# Patient Record
Sex: FEMALE | Race: WHITE
[De-identification: names, ages, dates, MRNs, and addresses within clinical notes are randomized per-mention and may not be internally consistent; named-entity substitution may affect disease eponyms.]

---

## 2018-12-24 ENCOUNTER — HOSPITAL ENCOUNTER (OUTPATIENT)
Age: 33
Discharge: HOME | End: 2018-12-24

## 2018-12-24 ENCOUNTER — HOSPITAL ENCOUNTER (OUTPATIENT)
Dept: HOSPITAL 91 - OBT | Age: 33
Discharge: HOME | End: 2018-12-24
Payer: COMMERCIAL

## 2018-12-24 DIAGNOSIS — O09.523: ICD-10-CM

## 2018-12-24 DIAGNOSIS — Z3A.36: ICD-10-CM

## 2018-12-24 DIAGNOSIS — O09.293: Primary | ICD-10-CM

## 2018-12-24 DIAGNOSIS — O36.8330: ICD-10-CM

## 2018-12-24 LAB — RUPTURE FETAL MEMBRANES: NEGATIVE

## 2018-12-24 PROCEDURE — 76818 FETAL BIOPHYS PROFILE W/NST: CPT

## 2018-12-24 PROCEDURE — 84112 EVAL AMNIOTIC FLUID PROTEIN: CPT

## 2018-12-31 ENCOUNTER — HOSPITAL ENCOUNTER (OUTPATIENT)
Dept: HOSPITAL 10 - OBT | Age: 33
Discharge: HOME | End: 2018-12-31
Attending: SPECIALIST
Payer: COMMERCIAL

## 2018-12-31 ENCOUNTER — HOSPITAL ENCOUNTER (OUTPATIENT)
Dept: HOSPITAL 91 - OBT | Age: 33
Discharge: HOME | End: 2018-12-31
Payer: COMMERCIAL

## 2018-12-31 VITALS — HEART RATE: 76 BPM | SYSTOLIC BLOOD PRESSURE: 119 MMHG | DIASTOLIC BLOOD PRESSURE: 71 MMHG | RESPIRATION RATE: 20 BRPM

## 2018-12-31 VITALS
HEIGHT: 63 IN | BODY MASS INDEX: 42.97 KG/M2 | BODY MASS INDEX: 42.97 KG/M2 | WEIGHT: 242.51 LBS | HEIGHT: 63 IN | WEIGHT: 242.51 LBS

## 2018-12-31 DIAGNOSIS — O36.8330: ICD-10-CM

## 2018-12-31 DIAGNOSIS — O09.523: ICD-10-CM

## 2018-12-31 DIAGNOSIS — Z3A.38: ICD-10-CM

## 2018-12-31 DIAGNOSIS — O09.293: Primary | ICD-10-CM

## 2018-12-31 LAB — RUPTURE FETAL MEMBRANES: NEGATIVE

## 2018-12-31 PROCEDURE — G0463 HOSPITAL OUTPT CLINIC VISIT: HCPCS

## 2018-12-31 PROCEDURE — 76815 OB US LIMITED FETUS(S): CPT

## 2018-12-31 PROCEDURE — 84112 EVAL AMNIOTIC FLUID PROTEIN: CPT

## 2018-12-31 PROCEDURE — 76818 FETAL BIOPHYS PROFILE W/NST: CPT

## 2018-12-31 NOTE — PN
Triage Information


Date/Time





Reason for visit:  38+wks Hx of IUFD for NST BPP


Weeks of Gestation


38+


/Para


5/4


Diabetes:  none


Hypertention:  none





Objective





Vital Signs


  Date      Temp  Pulse  Resp  B/P (MAP)   Pulse Ox  O2          O2 Flow    FiO2


Time                                                 Delivery    Rate


  18  97.7     76    20      119/71            Room Air


     09:02                           (87)








Results/Medications


Results 24 hrs





Laboratory Tests


                    Test
                    18
11:10


                    Fetal Membranes Rupture  NEGATIVE





Disposition:  Discharge


Assessment/Plan


BP 10/10


Precautions discussed


Questions answered


Follow up with provider











KEITH THOMPSON M.D.            Dec 31, 2018 12:19

## 2018-12-31 NOTE — TRIAGE
===================================

OB Triage

===================================

Datetime Report Generated by CPN: 12/31/2018 13:17

   

   

===========================

Datetime: 12/31/2018 10:45

===========================

   

   

===================================

Labor Evaluation

===================================

   

 Frequency:  NONE

 Pattern:  Normal: <= 5 Contractions in 10 Minutes

 Resting Tone Bud:  Relaxed

   

===================================

Fetal Heart Rate

===================================

   

 FHR Baseline Rate:  135

 Monitor Mode:  External US

 FHR Baseline Changes:  No Baseline Change

 Variability:  Moderate 6-25 bpm

 Accelerations:  15X15

 Decelerations:  None

 Category:  Category I

   

===================================

Pain Assessment

===================================

   

 Pain Scale:  0

 Pain Goal:  0

   

===========================

Datetime: 12/31/2018 08:51

===========================

   

   

===================================

Maternal Assessment

===================================

   

 Level of Consciousness:  Fully Conscious

 DTR's/Clonus:  DTRs 2+; No Clonus

 Headache:  Denies

 Blurred Vision:  No

 Respiratory Effort:  Unlabored; Regular Rhythm; Equal Expansion

 Breath Sounds, Left:  Clear and Equal

 Breath Sounds, Right:  Clear and Equal

 Nausea/Vomiting:  Denies

 RUQ Epigastric Pain:  Denies

 Facial Edema:  None

 Temperature Route:  Axillary

   

===================================

Fall Risk Assessment

===================================

   

 History of Falling:  (0) No

 Secondary Diagnosis:  (0) No

 Ambulatory Aid:  (0) Bedrest/Nurse Assist

 IV Therapy:  (0) No

 Gait:  (0) Normal/Bedrest/Immobile

 Mental Status:  (0) Oriented to Own Ability

 Fall Score:  0

 Fall Risk Score Definition:  No Risk: No action required

   

===========================

Datetime: 12/31/2018 08:45

===========================

   

   

===================================

Maternal Assessment

===================================

   

 Level of Consciousness:  Fully Conscious

 DTR's/Clonus:  DTRs 2+

 Headache:  Denies

 Blurred Vision:  No

 Nausea/Vomiting:  Denies

 RUQ Epigastric Pain:  Denies

 Facial Edema:  None

 Pattern:  Normal: <= 5 Contractions in 10 Minutes

   

===================================

Fetal Heart Rate

===================================

   

 FHR Baseline Rate:  145

 Monitor Mode:  External US

 FHR Baseline Changes:  No Baseline Change

 Variability:  Moderate 6-25 bpm

 Accelerations:  10X10

 Decelerations:  None

 Category:  Category I

   

===================================

Pain Assessment

===================================

   

 Pain Scale:  0

 Pain Presence:  None/Denies

 Pain Type:  N/A

 Pain Goal:  0

   

===================================

Vaginal Exam

===================================

   

 Membrane Status:  Intact

   

===========================

Datetime: 12/24/2018 10:52

===========================

   

 Time of Arrival:  12/31/2018 09:30

 EGA:  37.5

 Arrived By:  Ambulatory

 Arrived From:  Home

 Chief Complaint:  NST/BPP DUE TO TERM FD WITH PRIOR PREGNANCY

 Fetal Movement:  Present

 Contractions:  Denies/Absent

 Rupture of Membranes:  Denies

 Vaginal Bleeding:  None

 Vaginal Discharge:  Denies

 Recent Sexual Intercouse:  Denies

 Abdominal Trauma:  Not Applicable

 Patient Complaints:  None

 Provider Notified:  DR treadwell

 Initial Plan:  EFM,NST.BPP

   

===========================

Datetime: 12/24/2018 10:41

===========================

   

 Fall Score:  0

 Fall Risk Score Definition:  No Risk: No action required

## 2019-01-02 ENCOUNTER — HOSPITAL ENCOUNTER (INPATIENT)
Dept: HOSPITAL 10 - L-D | Age: 34
LOS: 3 days | Discharge: HOME | End: 2019-01-05
Attending: SPECIALIST | Admitting: SPECIALIST
Payer: COMMERCIAL

## 2019-01-02 VITALS
HEIGHT: 64 IN | BODY MASS INDEX: 35.45 KG/M2 | HEIGHT: 64 IN | BODY MASS INDEX: 35.45 KG/M2 | WEIGHT: 207.68 LBS | WEIGHT: 207.68 LBS

## 2019-01-02 VITALS — HEART RATE: 84 BPM | DIASTOLIC BLOOD PRESSURE: 70 MMHG | SYSTOLIC BLOOD PRESSURE: 120 MMHG | RESPIRATION RATE: 19 BRPM

## 2019-01-02 DIAGNOSIS — Z3A.38: ICD-10-CM

## 2019-01-02 PROCEDURE — 86850 RBC ANTIBODY SCREEN: CPT

## 2019-01-02 PROCEDURE — 81003 URINALYSIS AUTO W/O SCOPE: CPT

## 2019-01-02 PROCEDURE — 85730 THROMBOPLASTIN TIME PARTIAL: CPT

## 2019-01-02 PROCEDURE — 85610 PROTHROMBIN TIME: CPT

## 2019-01-02 PROCEDURE — 86592 SYPHILIS TEST NON-TREP QUAL: CPT

## 2019-01-02 PROCEDURE — 62319: CPT

## 2019-01-02 PROCEDURE — 87086 URINE CULTURE/COLONY COUNT: CPT

## 2019-01-02 PROCEDURE — 86900 BLOOD TYPING SEROLOGIC ABO: CPT

## 2019-01-02 PROCEDURE — 87340 HEPATITIS B SURFACE AG IA: CPT

## 2019-01-02 PROCEDURE — 85025 COMPLETE CBC W/AUTO DIFF WBC: CPT

## 2019-01-02 PROCEDURE — 86901 BLOOD TYPING SEROLOGIC RH(D): CPT

## 2019-01-02 RX ADMIN — PYRIDOXINE HYDROCHLORIDE SCH MLS/HR: 100 INJECTION, SOLUTION INTRAMUSCULAR; INTRAVENOUS at 18:10

## 2019-01-02 RX ADMIN — FENTANYL CIT 0.2 MG/100ML-ROPIV 0.2%-NACL 0.9% EPIDURAL INJ SCH ML: 2/0.2 SOLUTION at 22:42

## 2019-01-02 RX ADMIN — Medication SCH MLS/HR: at 12:17

## 2019-01-02 RX ADMIN — PYRIDOXINE HYDROCHLORIDE SCH MLS/HR: 100 INJECTION, SOLUTION INTRAMUSCULAR; INTRAVENOUS at 19:05

## 2019-01-02 RX ADMIN — PYRIDOXINE HYDROCHLORIDE SCH MLS/HR: 100 INJECTION, SOLUTION INTRAMUSCULAR; INTRAVENOUS at 11:18

## 2019-01-02 NOTE — QN
Documentation


Comment


I was called by RN to evaluate the patient that has been admitted for induction 


of labor due to history of prior IUFD at term and vaginal bleeding  Reports had 


history of Low lying placenta.


Patient apparently was feeling her water bag was broken due to gush of fluid, 


noted by RN that has bleeding with a large blood clot. QBL estimated to be 150 


cc, Primary attending. Dr. uMniz requested evaluation and AROM 


Attended to the patient's bed side.Noted comfortable with epidural


Tracing evaluated. Cat 1


contractions every 2-3 min noted





SVE: 3-4/20/-3, vertex


No forebag noted


Appears to be SROM 


No active bleeding noted





Discussed with RN emanuelam findings


Start pitocin


Watch carefully tracing and for bleeding


Anticipate 











GIL HAIDER MD               2019 20:21

## 2019-01-02 NOTE — HP
Date/Time of Note


Date/Time of Note


DATE: 19 


TIME: 15:58





OB - History


Hx of Present Pregnancy


Free Text/Dictation


33 years old  with single intrauterine pregnancy at 38 weeks with a JONAH 


of 2018 with history of term fetal demise scheduled for induction of labor.


 She states good fetal movement.  She denies nausea, vomiting, shortness of 


breath, chest pain, headache, visual changes, vaginal bleeding or LOF.


Chief Complaint:  Scheduled for induction of labor


Estimated Due Date:  2018


:  6


Para:  4


Spontaneous :  0


Therapeutic :  1


Prenatal Care:  Good Care


Ultrasounds:  Normal mid trimester US


Obstetrical Complications:  None


Medical Complications:  None





Past Family/Social History


*


Past Medical, Surgical, Family and Obstetric Histories reviewed from prenatal 


chart.


Blood Type:  AB+


Rubella:  immune


RPR/VDRL:  Negative


GBS Status:  Negative


HBsAG:  Negative





OB  Admission Exam


Vital Signs


Vital Signs





Vital Signs


  Date      Temp  Pulse  Resp  B/P (MAP)   Pulse Ox  O2          O2 Flow    FiO2


Time                                                 Delivery    Rate


    19  98.0     84    19      120/70            Room Air


     08:30                           (87)








Physical Exam


HEENT:  WNL


Heart:  Rhythm Normal


Lungs:  Clear


Abdomen:  WNL


Extremities:  Normal


Cervical Dilatation:  3cm


Effacement:  50%


Station:  -2


Membranes:  Intact


Fetal Heart Rate:  130's


Accelerations:  Accelerations Present


Decelerations:  No Decelerations


Varibility:  Moderate


Contractions on Admission:  >10 Minutes Apart


Intensity:  Mild


Last 72 hours Lab Results


                                    CBC & BMP


19 10:30











OB  Assessment/Plan


Other plan:


33 years old  at 38 weeks with history of term fetal demise admitted for 


induction of labor.


-  FHR: No sign of fetal metabolic acidosis- Category I


-  Continuous EFM, toco


-  CBC, blood type and screen


-  Analgesia options with R/B/A discussed in detail with patient


-  Epidural per patient request


-  Please see the orders


-  AB+/Rubella: Immune


-  GBS: negative 


Admission, procedures, expectations, risks and possible complications have been 


discussed in detail with the patient. Risk of vaginal delivery including but not


limited to bleeding, infection, cervical laceration, placental retention, injury


to fetus, blood transfusion, blood transfusion related infection, risk of 


anesthesia, adhesion, cervical laceration, episiotomy/laceration, possible 


 delivery with risk of bleeding, infection, injury to other organs  


(bowel, bladder, ureter, vessels, nerves), injury to fetus, blood transfusion, 


blood transfusion related infection, risk of anesthesia, scar and hernia 


formation, needs for future , removal of uterus or any other indicated 


surgery discussed with the patient. She expressed understanding and repeats the 


risks. All of her questions were answered. She signed the informed consent.





PHYSICIAN'S VERIFICATION OF INFORMED CONSENT


The patient was counseled regarding the procedure, its indications, risks, 


potential complications and alternatives and any questions were answered. 


Consent was obtained.





PLANNED PROCEDURE/TREATMENT:  Vaginal delivery, episiotomy, repair of laceration


possible  delivery











MEGHANA CARLSON                   2019 16:03

## 2019-01-02 NOTE — PREAC
Date/Time of Note


Date/Time of Note


DATE: 1/2/19 


TIME: 17:12





Anesthesia Eval and Record


Evaluation


Time Pre-Procedure Interview


DATE: 1/2/19 


TIME: 17:12


Age


33


Sex


female


NPO:  8 hrs


Preoperative diagnosis


labor pain


Planned procedure


labor epidural





Past Medical History


Past Medical History:  None





Surgery & Anesthesia Issues


No known issue





Meds


Anticoagulation:  No


Beta Blocker within 24 hr:  No


Reason Beta Blocker not given:  Pt. not on B-Blocker


Reported Medications


Fluticasone-Vilanterol (Breo Ellipta Inhaler) 100-25 Mcg/Actuation Aer.pow.ba, 1


PUFF INHALATION DAILY, #1 INHALER


   12/24/18


Ferrous Sulfate* (Ferrous Sulfate*) 325 Mg Tabec, 325 MG PO DAILY, TAB


   12/24/18


Prenatal Vit No.124/Iron/FA (Prenatal Vitamin Tablet) 1 Each Tablet, 1 EACH PO 


DAILY, TAB


   12/24/18





Current Medications


Lactated Ringer's 1,000 ml @  125 mls/hr Q8H IV  Last administered on 1/2/19at 


11:18; Admin Dose 125 MLS/HR;  Start 1/2/19 at 08:21


Butorphanol Tartrate (Stadol) 1 mg Q2H  PRN IV PAIN;  Start 1/2/19 at 08:30


Butorphanol Tartrate (Stadol) 2 mg Q2H  PRN IV PAIN;  Start 1/2/19 at 08:30


Lidocaine (Xylocaine 1% (Mpf)) 30 ml ONCE PRN INJ EPISIOTOMY;  Start 1/2/19 at 


08:30


Oxytocin/Lactated Ringer's 500 ml @  500 mls/hr ONCE POST PARTUM IV ;  Start 


1/2/19 at 08:30


Oxytocin/Lactated Ringer's 500 ml @  125 mls/hr POST PARTUM IV ;  Start 1/2/19 


at 08:30


Ibuprofen (Motrin) 600 mg ONCE PRN PO PAIN LEVEL 1-5;  Start 1/2/19 at 08:30


Lactated Ringer's 1,000 ml @  2,000 mls/hr Q30M PRN IV ANESTHESIA;  Start 1/2/19


at 08:21


Oxytocin/Lactated Ringer's 500 ml @ 0 mls/hr ONCE PRN IV VAGINAL BLEEDING;  Star


t 1/2/19 at 08:30


Methylergonovine Maleate (Methergine) 0.2 mg ONCE PRN IM VAGINAL BLEEDING;  


Start 1/2/19 at 08:30


Carboprost Tromethamine (Hemabate) 250 mcg ONCE PRN IM VAGINAL BLEEDING;  Start 


1/2/19 at 08:30


Misoprostol (Cytotec) 1,000 mcg ONCE PRN AR VAGINAL BLEEDING;  Start 1/2/19 at 


08:30


Oxytocin/Lactated Ringer's 500 ml @ 0 mls/hr Q0M IV  Last administered on 


1/2/19at 12:17; Admin Dose 1 MLS/HR;  Start 1/2/19 at 12:00


Meds reviewed:  Yes





Allergies


Coded Allergies:  


     No Known Allergy (Unverified , 12/24/18)


Allergies Reviewed:  Yes





Labs/Studies


Labs Reviewed:  Reviewed by anesthesiologist


Result Diagram:  


1/2/19 1030





Laboratory Tests


1/2/19 10:30











Blood Bank


                   Test
                         1/2/19
10:30


                   Antibody Screen               NEGATIVE


                   Blood Type                    AB POSITIVE


                   Rh Immune Globulin Candidate  NO





Pregnancy test:  Positive





Pre-procedure Exam


Last vitals





Vital Signs


  Date      Temp  Pulse  Resp  B/P (MAP)   Pulse Ox  O2          O2 Flow    FiO2


Time                                                 Delivery    Rate


    1/2/19  98.0     84    19      120/70            Room Air


     08:30                           (87)





Airway:  Adequate mouth opening, Adequate thyromental dist


Mallampati:  Mallampati III


Teeth:  Normal


Lung:  Normal


Heart:  Normal





ASA Physical Status


ASA physical status:  2


Emergency:  None





Planned Anesthetic


Neuraxial:  Epidural





Planned Pain Management


Epidural, Parenteral pain med, Other neuraxial med





Pre-operative Attestations


Prior to commencing anesthesia and surgery, the patient was re-evaluated, there 


was verification of:


*The patient's identity


*The results of appropriate recent lab work and preoperative vital signs


*The above evaluation not changing prior to induction


*Anesthetic plan, risk benefits, alternative and complications discussed with 


patient/family; questions answered; patient/family understands, accepts and w


ishes to proceed.











TERRA SAMS MD                Jan 2, 2019 17:13

## 2019-01-03 VITALS — SYSTOLIC BLOOD PRESSURE: 116 MMHG | HEART RATE: 76 BPM | RESPIRATION RATE: 19 BRPM | DIASTOLIC BLOOD PRESSURE: 59 MMHG

## 2019-01-03 VITALS — RESPIRATION RATE: 19 BRPM | SYSTOLIC BLOOD PRESSURE: 116 MMHG | DIASTOLIC BLOOD PRESSURE: 71 MMHG | HEART RATE: 72 BPM

## 2019-01-03 VITALS — DIASTOLIC BLOOD PRESSURE: 77 MMHG | HEART RATE: 74 BPM | SYSTOLIC BLOOD PRESSURE: 129 MMHG | RESPIRATION RATE: 19 BRPM

## 2019-01-03 VITALS — HEART RATE: 82 BPM | SYSTOLIC BLOOD PRESSURE: 117 MMHG | DIASTOLIC BLOOD PRESSURE: 75 MMHG | RESPIRATION RATE: 18 BRPM

## 2019-01-03 VITALS — RESPIRATION RATE: 18 BRPM | HEART RATE: 96 BPM | DIASTOLIC BLOOD PRESSURE: 57 MMHG | SYSTOLIC BLOOD PRESSURE: 107 MMHG

## 2019-01-03 RX ADMIN — PYRIDOXINE HYDROCHLORIDE SCH MLS/HR: 100 INJECTION, SOLUTION INTRAMUSCULAR; INTRAVENOUS at 09:14

## 2019-01-03 RX ADMIN — IBUPROFEN SCH MG: 600 TABLET ORAL at 18:00

## 2019-01-03 RX ADMIN — DOCUSATE SODIUM AND SENNOSIDES SCH TAB: 8.6; 5 TABLET, FILM COATED ORAL at 21:00

## 2019-01-03 RX ADMIN — DOCUSATE SODIUM AND SENNOSIDES SCH TAB: 8.6; 5 TABLET, FILM COATED ORAL at 12:22

## 2019-01-03 RX ADMIN — PYRIDOXINE HYDROCHLORIDE SCH MLS/HR: 100 INJECTION, SOLUTION INTRAMUSCULAR; INTRAVENOUS at 17:14

## 2019-01-03 RX ADMIN — PYRIDOXINE HYDROCHLORIDE SCH MLS/HR: 100 INJECTION, SOLUTION INTRAMUSCULAR; INTRAVENOUS at 01:14

## 2019-01-03 RX ADMIN — FLUTICASONE FUROATE AND VILANTEROL TRIFENATATE SCH INH: 100; 25 POWDER RESPIRATORY (INHALATION) at 12:00

## 2019-01-03 RX ADMIN — FENTANYL CIT 0.2 MG/100ML-ROPIV 0.2%-NACL 0.9% EPIDURAL INJ SCH ML: 2/0.2 SOLUTION at 00:12

## 2019-01-03 RX ADMIN — Medication SCH MLS/HR: at 03:36

## 2019-01-03 NOTE — LDN
Date/Time of Note


Date/Time of Note


DATE: 1/3/19 


TIME: 01:09





Delivery Summary


32 YO  with EDC 2018 who was induced due to h/o IUFD. 


s/p  of viable male infant with APGARS 8/8 with intact peritoneum, placenta 


delivered spontaneously and intact


Placenta Delivered:  Spontaneously


Meconium:  Light


Episiotomy:  No


Perineal laceration:  0


Anesthesia type:  Epidural


Sponge & Needle done & correct:  Yes


All needle counts correct:  Yes


Any foreign bodies felt in the:  No





Infant Delivery Information


Sex


Infant Sex:  male





Apgars


1 Minute:  8


5 Minute:  8





Suctioning


Nose & mouth suctioned at mirza:  No


Delee suction performed:  No





Umbilical Cord


Umbilical cord with:  3 Vessels


Cord presentations:  nuchal cord


Nuchal cord present X:  2


Cord Blood was obtained:  Yes





Mother & Baby Disposition


Disposition


Mom & Baby to Maternity; Good:  Yes











TRAVIS CLEMENTS MD             Ty 3, 2019 01:14

## 2019-01-03 NOTE — PAC
Date/Time of Note


Date/Time of Note


DATE: 1/3/19 


TIME: 01:23





Post-Anesthesia Notes


Post-Anesthesia Note


Last documented vital signs





Vital Signs


  Date      Temp  Pulse  Resp  B/P (MAP)   Pulse Ox  O2          O2 Flow    FiO2


Time                                                 Delivery    Rate


    1/2/19  98.0     84    19      120/70            Room Air


     08:30                           (87)





Activity:  WNL


Respiratory function:  WNL


Cardiovascular function:  WNL


Mental status:  Baseline


Pain reasonably controlled:  Yes


Hydration appropriate:  Yes


Nausea/Vomiting absent:  Yes











TERRA SAMS MD                Ty 3, 2019 01:23

## 2019-01-03 NOTE — NUR
EOSS: Vital signs stable, denies pain or discomfort at this time. PRN Norco given x1. 
Patient refused scheduled Motrin this evening. Bonding with baby.

## 2019-01-03 NOTE — NUR
eoss. stable vital signs . afebrile. voided once in the unit .scanty lochia. ambulating & 
voided well without difficulty.  bonding well with baby. fob at bedside for support

## 2019-01-04 VITALS — HEART RATE: 69 BPM | SYSTOLIC BLOOD PRESSURE: 123 MMHG | DIASTOLIC BLOOD PRESSURE: 73 MMHG | RESPIRATION RATE: 18 BRPM

## 2019-01-04 VITALS — DIASTOLIC BLOOD PRESSURE: 50 MMHG | SYSTOLIC BLOOD PRESSURE: 95 MMHG | HEART RATE: 69 BPM | RESPIRATION RATE: 16 BRPM

## 2019-01-04 VITALS — SYSTOLIC BLOOD PRESSURE: 126 MMHG | HEART RATE: 71 BPM | RESPIRATION RATE: 18 BRPM | DIASTOLIC BLOOD PRESSURE: 61 MMHG

## 2019-01-04 VITALS — SYSTOLIC BLOOD PRESSURE: 129 MMHG | HEART RATE: 81 BPM | RESPIRATION RATE: 18 BRPM | DIASTOLIC BLOOD PRESSURE: 55 MMHG

## 2019-01-04 RX ADMIN — DOCUSATE SODIUM AND SENNOSIDES SCH TAB: 8.6; 5 TABLET, FILM COATED ORAL at 09:00

## 2019-01-04 RX ADMIN — IBUPROFEN SCH MG: 600 TABLET ORAL at 06:00

## 2019-01-04 RX ADMIN — IBUPROFEN SCH MG: 600 TABLET ORAL at 00:00

## 2019-01-04 RX ADMIN — FLUTICASONE FUROATE AND VILANTEROL TRIFENATATE SCH INH: 100; 25 POWDER RESPIRATORY (INHALATION) at 12:30

## 2019-01-04 RX ADMIN — IBUPROFEN SCH MG: 600 TABLET ORAL at 18:07

## 2019-01-04 RX ADMIN — IBUPROFEN SCH MG: 600 TABLET ORAL at 12:00

## 2019-01-04 RX ADMIN — DOCUSATE SODIUM AND SENNOSIDES SCH TAB: 8.6; 5 TABLET, FILM COATED ORAL at 21:00

## 2019-01-04 NOTE — NUR
Mother is breastfeeding in the bed with the side rails up,put the side rails up and 
explained to her again that everytime she breastfeeds the side rails should be up for baby 
safety and to prevent the baby from falling.Patient refused she said that she could not go 
the the bathroom when she needs to void,instructed her to call me when she needs to use the 
restroom or she needs to get up but patient refused.

## 2019-01-04 NOTE — NUR
INFORMED MOM ABOUT MD ORDER  FOR THE BABY TO SUPPLEMENT FORMULA AND REPEAT BILI IN AM . MOM 
VERBALIZED UNDERSTANDING.

## 2019-01-05 VITALS — RESPIRATION RATE: 18 BRPM | SYSTOLIC BLOOD PRESSURE: 132 MMHG | DIASTOLIC BLOOD PRESSURE: 65 MMHG | HEART RATE: 78 BPM

## 2019-01-05 VITALS — DIASTOLIC BLOOD PRESSURE: 67 MMHG | HEART RATE: 80 BPM | RESPIRATION RATE: 18 BRPM | SYSTOLIC BLOOD PRESSURE: 111 MMHG

## 2019-01-05 RX ADMIN — FLUTICASONE FUROATE AND VILANTEROL TRIFENATATE SCH INH: 100; 25 POWDER RESPIRATORY (INHALATION) at 11:43

## 2019-01-05 RX ADMIN — IBUPROFEN SCH MG: 600 TABLET ORAL at 06:00

## 2019-01-05 RX ADMIN — DOCUSATE SODIUM AND SENNOSIDES SCH TAB: 8.6; 5 TABLET, FILM COATED ORAL at 09:00

## 2019-01-05 RX ADMIN — IBUPROFEN SCH MG: 600 TABLET ORAL at 11:43

## 2019-01-05 RX ADMIN — IBUPROFEN SCH MG: 600 TABLET ORAL at 00:00

## 2019-01-05 NOTE — DS
Date/Time of Note


Date/Time of Note


DATE: 19 


TIME: 07:40





Obstetrical Discharge Record


Final Diagnosis


Final Diagnosis:  Term delivered


Other Final Diagnosis


23-year-old  s/p normal vaginal delivery at 38 weeks. PPD#2.  Hospital 


course was unremarkable.  She is ambulating and tolerating regular diet.  She is


voiding without difficulty.  Pain is controlled on current medication


-  AF, VSS


-  Baby is doing well, at bed side. She is bonding well


-  Contraception methods with R/B/A/FR discussed


-  Continue Postpartum care


-  Discharge home 


-  Rx and instruction given


-  Follow up in 2 and 6 weeks at clinic





Vaginal Delivery


Obstetrical Delivery:  Spontaneous





Complications


Induction:  Yes (For history of term fetal demise)





Condition on Discharge


Physical Assessment


Voiding:  Yes


Bowel Movement:  Yes


Breast:  Soft, non-tender


Fundus:  Firm


Calf Tenderness:  No


Patient Condition:  Stable











MEGHANA CARLSON                   2019 07:42

## 2019-01-05 NOTE — PN
Date/Time of Note


Date/Time of Note


DATE: 19 


TIME: 07:38





OB Subjective


Subjective


Subjective


Late entry note.  Patient seen on 2019 at 19:30


PPD# 1





Patient is doing well. She denies nausea, vomiting, shortness of breath, chest 


pain, headache. She has been ambulating without difficulty, tolerating regular 


diet. Pain is well controlled on current medications





OB Objective


Objective


Objective





Vital Signs


  Date      Temp  Pulse  Resp  B/P (MAP)   Pulse Ox  O2          O2 Flow    FiO2


Time                                                 Delivery    Rate


    19  98.8     80    18      111/67            Room Air


     04:17                           (82)








General: AAO X 3, comfortable, NAD, appropriate mood and affect.


ABD: +BS. Soft, non-tender. Uterus 2 cm below umbilicus 


Flank: No CVA tenderness (B/L)


LE: Mild edema. No clubbing, cyanosis, thigh or calf tenderness (B/L). Homans 


'sign is negative





OB  Assessment/Plan


Other plan:





23-year-old  s/p normal vaginal delivery at 38 weeks. PPD#1


-  AF, VSS


-  Baby is doing well, at bed side. She is bonding well


-  Contraception methods with R/B/A/FR discussed


-  Continue Postpartum care


-  Discharge home tomorrow


-  Rx and instruction given


-  Follow up in 2 and 6 weeks at clinic











MEGHANA CARLSON                   2019 07:40